# Patient Record
Sex: FEMALE | Race: WHITE | URBAN - METROPOLITAN AREA
[De-identification: names, ages, dates, MRNs, and addresses within clinical notes are randomized per-mention and may not be internally consistent; named-entity substitution may affect disease eponyms.]

---

## 2019-08-09 PROBLEM — E66.9 OBESITY (BMI 30-39.9): Status: ACTIVE | Noted: 2019-08-09

## 2019-08-09 PROCEDURE — 88175 CYTOPATH C/V AUTO FLUID REDO: CPT | Performed by: INTERNAL MEDICINE

## 2019-11-12 PROBLEM — E78.00 ELEVATED LDL CHOLESTEROL LEVEL: Status: ACTIVE | Noted: 2019-11-12

## 2019-11-12 PROBLEM — I10 ESSENTIAL HYPERTENSION: Status: ACTIVE | Noted: 2019-11-12

## 2021-02-23 PROBLEM — G47.33 OBSTRUCTIVE SLEEP APNEA: Status: ACTIVE | Noted: 2021-02-23

## 2023-04-09 ENCOUNTER — HOSPITAL ENCOUNTER (EMERGENCY)
Facility: HOSPITAL | Age: 48
Discharge: HOME | End: 2023-04-09
Attending: STUDENT IN AN ORGANIZED HEALTH CARE EDUCATION/TRAINING PROGRAM
Payer: COMMERCIAL

## 2023-04-09 VITALS
HEART RATE: 65 BPM | RESPIRATION RATE: 20 BRPM | DIASTOLIC BLOOD PRESSURE: 79 MMHG | OXYGEN SATURATION: 97 % | HEIGHT: 64 IN | TEMPERATURE: 98 F | SYSTOLIC BLOOD PRESSURE: 119 MMHG | WEIGHT: 210 LBS | BODY MASS INDEX: 35.85 KG/M2

## 2023-04-09 DIAGNOSIS — M54.50 ACUTE BILATERAL LOW BACK PAIN WITHOUT SCIATICA: Primary | ICD-10-CM

## 2023-04-09 PROCEDURE — 96372 THER/PROPH/DIAG INJ SC/IM: CPT

## 2023-04-09 PROCEDURE — 99284 EMERGENCY DEPT VISIT MOD MDM: CPT

## 2023-04-09 RX ORDER — ACETAMINOPHEN 500 MG
1000 TABLET ORAL ONCE
Status: COMPLETED | OUTPATIENT
Start: 2023-04-09 | End: 2023-04-09

## 2023-04-09 RX ORDER — KETOROLAC TROMETHAMINE 10 MG/1
10 TABLET, FILM COATED ORAL EVERY 6 HOURS PRN
Qty: 30 TABLET | Refills: 0 | Status: SHIPPED | OUTPATIENT
Start: 2023-04-09 | End: 2023-04-16

## 2023-04-09 RX ORDER — ORPHENADRINE CITRATE 30 MG/ML
60 INJECTION INTRAMUSCULAR; INTRAVENOUS ONCE
Status: COMPLETED | OUTPATIENT
Start: 2023-04-09 | End: 2023-04-09

## 2023-04-09 RX ORDER — ORPHENADRINE CITRATE 100 MG/1
100 TABLET, EXTENDED RELEASE ORAL 2 TIMES DAILY
Qty: 20 TABLET | Refills: 0 | Status: SHIPPED | OUTPATIENT
Start: 2023-04-09 | End: 2023-04-16

## 2023-04-09 RX ORDER — MORPHINE SULFATE 4 MG/ML
4 INJECTION, SOLUTION INTRAMUSCULAR; INTRAVENOUS ONCE
Status: COMPLETED | OUTPATIENT
Start: 2023-04-09 | End: 2023-04-09

## 2023-04-09 NOTE — DISCHARGE INSTRUCTIONS
Please return to the emergency department if you develop severe pain that is not controlled by  pain medications or if you are unable to walk because of pain or weakness. Return to the  emergency department immediately if you develop fevers, loss of bowel or bladder control  (dribbling of urine or having accidents you wouldn't normally have), inability to urinate,  numbness of your genital or anal area, or weakness/numbness of your legs or arms as these  could all be signs of a serious medical emergency.

## 2023-04-09 NOTE — ED INITIAL ASSESSMENT (HPI)
S: pt presents to ed with lower back pain while cleaning the vacuum. Denies numbness or tingling to lower extremities or incontinence. B: htn    A: pain with walking, but ambulatory into triage. R: none.

## 2023-04-09 NOTE — ED QUICK NOTES
Patient reports lower back pain that began yesterday around 1700 when cleaning her vacuum canister. Patient states she went to bed and woke up to go to the bathroom, stating it took her 15 minutes to get out of bed because of the pain. Patient reports taking advil at 0100 with no relief. Denies numbness or tingling. Denies radiation. Denies urinary complaints.

## 2023-04-15 ENCOUNTER — HOSPITAL ENCOUNTER (OUTPATIENT)
Age: 48
Discharge: HOME | End: 2023-04-15
Payer: COMMERCIAL

## 2023-04-15 VITALS
OXYGEN SATURATION: 100 % | DIASTOLIC BLOOD PRESSURE: 83 MMHG | TEMPERATURE: 97 F | RESPIRATION RATE: 20 BRPM | SYSTOLIC BLOOD PRESSURE: 139 MMHG | HEART RATE: 90 BPM

## 2023-04-15 DIAGNOSIS — S39.012D LOW BACK STRAIN, SUBSEQUENT ENCOUNTER: Primary | ICD-10-CM

## 2023-04-15 PROCEDURE — 99213 OFFICE O/P EST LOW 20 MIN: CPT

## 2023-04-15 RX ORDER — CYCLOBENZAPRINE HCL 10 MG
10 TABLET ORAL 3 TIMES DAILY PRN
Qty: 20 TABLET | Refills: 0 | Status: SHIPPED | OUTPATIENT
Start: 2023-04-15

## 2023-04-15 RX ORDER — METHYLPREDNISOLONE 4 MG/1
TABLET ORAL
Qty: 1 EACH | Refills: 0 | Status: SHIPPED | OUTPATIENT
Start: 2023-04-15

## 2023-04-15 NOTE — ED INITIAL ASSESSMENT (HPI)
States she injured her back 1 week ago and was treated in the ED. Presents today due to pain. States she is better than she was in the ED but still having a lot of pain. States she was having \"swelling\" while taking the Ketorolac and Orphenadrine prescribed to her so she stopped. Having difficulty sitting and getting up.

## 2023-04-15 NOTE — DISCHARGE INSTRUCTIONS
Call Monday and get in to see your primary care provider. They will determine if you need imaging like an MRI for your back due to continued pain or physical therapy order. Take medrol dose pack as directed on package. This is a steroid that will help with inflammation process in back. Take flexeril two to three times a day as needed for muscle relaxing. DO not take if driving or working. Take after work or before bedtime to help. Continue ibuprofen 600mg every 6 hours for pain as needed. Use heating pad to area of pain for relaxing of muscle when resting at home    GO TO ED for worsening pain, incontinence, abdominal pain, fevers, loss of sensation to legs.